# Patient Record
(demographics unavailable — no encounter records)

---

## 2024-10-09 NOTE — HISTORY OF PRESENT ILLNESS
[FreeTextEntry1] : 63 year old male here for hospital f/u after PE and thrombectomy.  He feels anxious at times after the procedure.  Overall he feels better. Taking Eliquis. No chest pain. Is seeing a hematologist. had a DVT in his leg as well.

## 2024-10-09 NOTE — PHYSICAL EXAM
[Well Developed] : well developed [Well Nourished] : well nourished [No Carotid Bruit] : no carotid bruit [Normal S1, S2] : normal S1, S2 [Clear Lung Fields] : clear lung fields [Soft] : abdomen soft [Normal Gait] : normal gait [No Edema] : no edema [No Rash] : no rash [Moves all extremities] : moves all extremities [Alert and Oriented] : alert and oriented

## 2024-10-09 NOTE — DISCUSSION/SUMMARY
[FreeTextEntry1] : 1) Pulmonary embolus: C/w Eliquis. Seeing hematology as well. Will repeat echocardiogram to assess RV in 6 months  2) HTN: Controlled. C/w Amlodipine 5mg QD, and losartan 100mg QD  3) PAD: Sees Vascular. On plavix  4) DM: On treatment. discussed diet.  [EKG obtained to assist in diagnosis and management of assessed problem(s)] : EKG obtained to assist in diagnosis and management of assessed problem(s)

## 2024-10-23 NOTE — BEGINNING OF VISIT
[Medical reason not done] : Medical reason not done [0] : 2) Feeling down, depressed, or hopeless: Not at all (0) [Pain Scale: ___] : On a scale of 1-10, today the patient's pain is a(n) [unfilled]. [Future Reassessment of Pain Scale] : Future reassessment of pain scale [Former] : Former [> 15 Years] : > 15 Years [Date Discussed (MM/DD/YY): ___] : Discussed: [unfilled] [Patient/Caregiver not ready to engage] : Patient/Caregiver not ready to engage [[id number]] : 0 [Medication(s)] : Medication(s)

## 2024-10-23 NOTE — ADDENDUM
[FreeTextEntry1] : Documented by Omero Butcher acting as scribe for Dr. Cabrera on  10/23/2024.    All Medical record entries made by the Scribe were at my, Dr. Cabrera's, direction and personally dictated by me on  10/23/2024. I have reviewed the chart and agree that the record accurately reflects my personal performance of the history, physical exam, assessment and plan. I have also personally directed, reviewed, and agreed with the discharge instructions.

## 2024-10-23 NOTE — HISTORY OF PRESENT ILLNESS
[de-identified] : Jermaine Arevalo is a 63 yr old male with PMH of HTN, DM2, PAD (LLE angioplasty 3 years ago, RLE angioplasty 7/12/24, previously on plavix) diagnosed with acute saddle PE/DVT (9/30/24).  He was brought to [facility] ED on 9/29/24 for evaluation of LOC for few seconds a/w loss of bladder control, left arm twitching, and profuse sweating. He was worked-up for stroke vs seizure, CT-Head was negative for acute intracranial hemorrhage. Chest CTA imaging revealed acute bilateral PE involving the distal main pulmonary arteries extending into all 5 lobar branches, component of right sided pulmonary embolus extending to the central pulmonary trunk, dilated RV, pulmonary hypertension. US duplex with right femoral DVT. EKG showed RBBB with S1Q3T3. TTE with LV EF 50%, Severely enlarged right ventricular cavity size and severely reduced right ventricular systolic function, Akinesis of the right ventricular free wall and apex, Mild tricuspid regurgitation, Estimated pulmonary artery systolic pressure of 53 mmHg. He was then transferred to [facility] w/ heparin GTT.   9/29/24-10/3/24 [facility] admission course s/p INARI thrombectomy on 9/30/24 Limited TTE after INARI thrombectomy showed normal LV systolic function, mildly enlarged RV cavity size and mildly reduced RV systolic function.  He was then discharged on Eliquis 10 mg po BID for a total of 7 days, followed by Eliquis 5 mg BID   Images/Procedures:  9/29/24 CT Head stroke protocol: No acute intracranial hemorrhage, mass effect, or midline shift.  9/29/4 CT A/P:IMPRESSION: -Bilateral lower lobe pulmonary emboli, partially imaged. Dilated right ventricle which may reflect right heart strain. Correlate with dedicated CT pulmonary angiogram. -Multivessel coronary artery calcifications. -Question gastritis.  9/29/24 CT Angio chest pulm artery wawic:IMPRESSION: Acute bilateral pulmonary emboli involving distal main pulmonary arteries extending into all 5 lobar branches. Component of the right-sided pulmonary embolus extends to the central pulmonary trunk on image 42 series 301. Main pulmonary artery is enlarged measuring 3.5 cm, which may reflect pulmonary hypertension. Elevated RV to LV ratio suggests right heart strain. Correlate with troponin value and hemodynamics to evaluate for significant pulmonary embolus.  9/29/24 US Duplex latia.LE:IMPRESSION: Deep venous thrombosis in the mid and distal right femoral vein. No evidence of deep venous thrombosis in the left lower extremity.  9/29/24 TTE:CONCLUSION: 1. technicaly difficult image quality 2.Left ventricular cavity is normal in size. Left ventricular wall thickness is mildly increased.  Left ventricular systolic function is borderline with an ejection fraction visually estimated at 50%. 3. Severely enlarged right ventricular cavity size and severely reduced right ventricular systolic function. 4. Akinesis of the right ventricular wall and apex. 5. Mild tricuspid regurgitation. 6. Fibrocalcific aortic valve sclerosis without stenosis. 7. Estimated pulmonary artery systolic pressure is 53 mm Hg  8. No pericardial effusion seen. 9. No prior echocardiogram is available for comparison.  9/30/24 s/p INARI Thrombectomy  10/1/24 TTE: CONCLUSIONS: 1. Left ventricular systolic function is normal. 2. Mildly enlarged right ventricular cavity size and mildly reduced right ventricular systolic function  Patient presents for initial visit Feels pain on back frequently, last few days has been running down buttocks and right leg Dr. Berger was vascular surgeon at [facility] Per patient for right leg angioplasty he reports needed to compress left side to stop bleeding Has prostate exam with PCP coming up in November 2024 Last colonoscopy within past 5 years Denies flight travels, long car rides, during Sept 2024 Did not have leg pain, SOB, chest pains around the time of diagnosis Has arterial doppler scheduled in November with Dr. Berger He reports sleeping fine recently, he reports he snores  PMHx: PMH of HTN, DM2, PAD FMHx: Denies family h/o blood clots Surg Hx: LLE angioplasty 3 years ago, RLE angioplasty 7/12/24 (previously on plavix), cholecystectomy, eye surgery Soc Hx: Former smoker (>15 years ago), drinks wine 2 glasses per day, 5 days a week, repairs vending machines at [employer] Care Team: Vascular Surgeon - Dr. Berger, [facility] PCP - Dr. Go

## 2024-10-23 NOTE — BEGINNING OF VISIT
[Medical reason not done] : Medical reason not done [0] : 2) Feeling down, depressed, or hopeless: Not at all (0) [Pain Scale: ___] : On a scale of 1-10, today the patient's pain is a(n) [unfilled]. [Future Reassessment of Pain Scale] : Future reassessment of pain scale [Former] : Former [> 15 Years] : > 15 Years [Date Discussed (MM/DD/YY): ___] : Discussed: [unfilled] [Patient/Caregiver not ready to engage] : Patient/Caregiver not ready to engage [[id number]] : 0 [Medication(s)] : Medication(s)

## 2024-10-23 NOTE — HISTORY OF PRESENT ILLNESS
[de-identified] : Jermaine Arevalo is a 63 yr old male with PMH of HTN, DM2, PAD (LLE angioplasty 3 years ago, RLE angioplasty 7/12/24, previously on plavix) diagnosed with acute saddle PE/DVT (9/30/24).  He was brought to [facility] ED on 9/29/24 for evaluation of LOC for few seconds a/w loss of bladder control, left arm twitching, and profuse sweating. He was worked-up for stroke vs seizure, CT-Head was negative for acute intracranial hemorrhage. Chest CTA imaging revealed acute bilateral PE involving the distal main pulmonary arteries extending into all 5 lobar branches, component of right sided pulmonary embolus extending to the central pulmonary trunk, dilated RV, pulmonary hypertension. US duplex with right femoral DVT. EKG showed RBBB with S1Q3T3. TTE with LV EF 50%, Severely enlarged right ventricular cavity size and severely reduced right ventricular systolic function, Akinesis of the right ventricular free wall and apex, Mild tricuspid regurgitation, Estimated pulmonary artery systolic pressure of 53 mmHg. He was then transferred to [facility] w/ heparin GTT.   9/29/24-10/3/24 [facility] admission course s/p INARI thrombectomy on 9/30/24 Limited TTE after INARI thrombectomy showed normal LV systolic function, mildly enlarged RV cavity size and mildly reduced RV systolic function.  He was then discharged on Eliquis 10 mg po BID for a total of 7 days, followed by Eliquis 5 mg BID   Images/Procedures:  9/29/24 CT Head stroke protocol: No acute intracranial hemorrhage, mass effect, or midline shift.  9/29/4 CT A/P:IMPRESSION: -Bilateral lower lobe pulmonary emboli, partially imaged. Dilated right ventricle which may reflect right heart strain. Correlate with dedicated CT pulmonary angiogram. -Multivessel coronary artery calcifications. -Question gastritis.  9/29/24 CT Angio chest pulm artery wawic:IMPRESSION: Acute bilateral pulmonary emboli involving distal main pulmonary arteries extending into all 5 lobar branches. Component of the right-sided pulmonary embolus extends to the central pulmonary trunk on image 42 series 301. Main pulmonary artery is enlarged measuring 3.5 cm, which may reflect pulmonary hypertension. Elevated RV to LV ratio suggests right heart strain. Correlate with troponin value and hemodynamics to evaluate for significant pulmonary embolus.  9/29/24 US Duplex latia.LE:IMPRESSION: Deep venous thrombosis in the mid and distal right femoral vein. No evidence of deep venous thrombosis in the left lower extremity.  9/29/24 TTE:CONCLUSION: 1. technicaly difficult image quality 2.Left ventricular cavity is normal in size. Left ventricular wall thickness is mildly increased.  Left ventricular systolic function is borderline with an ejection fraction visually estimated at 50%. 3. Severely enlarged right ventricular cavity size and severely reduced right ventricular systolic function. 4. Akinesis of the right ventricular wall and apex. 5. Mild tricuspid regurgitation. 6. Fibrocalcific aortic valve sclerosis without stenosis. 7. Estimated pulmonary artery systolic pressure is 53 mm Hg  8. No pericardial effusion seen. 9. No prior echocardiogram is available for comparison.  9/30/24 s/p INARI Thrombectomy  10/1/24 TTE: CONCLUSIONS: 1. Left ventricular systolic function is normal. 2. Mildly enlarged right ventricular cavity size and mildly reduced right ventricular systolic function  Patient presents for initial visit Feels pain on back frequently, last few days has been running down buttocks and right leg Dr. Berger was vascular surgeon at [facility] Per patient for right leg angioplasty he reports needed to compress left side to stop bleeding Has prostate exam with PCP coming up in November 2024 Last colonoscopy within past 5 years Denies flight travels, long car rides, during Sept 2024 Did not have leg pain, SOB, chest pains around the time of diagnosis Has arterial doppler scheduled in November with Dr. 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 Berger, [facility] PCP - Dr. Go

## 2024-10-23 NOTE — ASSESSMENT
[FreeTextEntry1] : 63 yr old male with h/o obesity, PAD s/p angioplasty, DM, HTN, with acute bilateral PE involving the distal main pulmonary arteries extending into all 5 lobar branches, component of right sided pulmonary embolus extending to the central pulmonary trunk, dilated RV, pulmonary hypertension. US duplex with right femoral DVT.  9/30/24 s/p INARI thrombectomy Currently on On Eliquis 5mg BID  No prior personal or family history of VTE. Former smoker. No recent travel preceding VTE.  Discussed with patient that recent RLE angioplasty on 7/12/24 unlikely to be related to the PE / DVT.  Given unprovoked DVT / PE, will likely require lifelong AC.  Plan: -Continue Eliquis 5 mg BID -Hypercoagulable workup sent -Right leg pain that has worsened over last few days - US doppler today to r/o DVT.  -RTO in 6 months

## 2025-04-23 NOTE — ASSESSMENT
[FreeTextEntry1] : 63 yr old male with h/o obesity, PAD s/p angioplasty, DM, HTN, with acute bilateral PE involving the distal main pulmonary arteries extending into all 5 lobar branches, component of right sided pulmonary embolus extending to the central pulmonary trunk, dilated RV, pulmonary hypertension. US duplex with right femoral DVT.  9/30/24 s/p INARI thrombectomy Currently on On Eliquis 5mg BID  No prior personal or family history of VTE. Former smoker. No recent travel preceding VTE.  Discussed with patient that recent RLE angioplasty on 7/12/24 unlikely to be related to the PE / DVT.  10/23/24 hypercoag work up - FVL, PT gene mutation - negative, Normal AT III, protein C & S.  Given unprovoked DVT / PE, will likely require lifelong AC.  4/10/25 H/H with PCP - 15 / 45  Plan: -Continue Eliquis 5 mg BID -follow up with vascular -RTO in 6 months

## 2025-04-23 NOTE — HISTORY OF PRESENT ILLNESS
[de-identified] : Jermaine Arevalo is a 63 yr old male with PMH of HTN, DM2, PAD (LLE angioplasty 3 years ago, RLE angioplasty 7/12/24, previously on plavix) diagnosed with acute saddle PE/DVT (9/30/24).  He was brought to [facility] ED on 9/29/24 for evaluation of LOC for few seconds a/w loss of bladder control, left arm twitching, and profuse sweating. He was worked-up for stroke vs seizure, CT-Head was negative for acute intracranial hemorrhage. Chest CTA imaging revealed acute bilateral PE involving the distal main pulmonary arteries extending into all 5 lobar branches, component of right sided pulmonary embolus extending to the central pulmonary trunk, dilated RV, pulmonary hypertension. US duplex with right femoral DVT. EKG showed RBBB with S1Q3T3. TTE with LV EF 50%, Severely enlarged right ventricular cavity size and severely reduced right ventricular systolic function, Akinesis of the right ventricular free wall and apex, Mild tricuspid regurgitation, Estimated pulmonary artery systolic pressure of 53 mmHg. He was then transferred to [facility] w/ heparin GTT.   9/29/24-10/3/24 [facility] admission course s/p INARI thrombectomy on 9/30/24 Limited TTE after INARI thrombectomy showed normal LV systolic function, mildly enlarged RV cavity size and mildly reduced RV systolic function.  He was then discharged on Eliquis 10 mg po BID for a total of 7 days, followed by Eliquis 5 mg BID   Images/Procedures:  9/29/24 CT Head stroke protocol: No acute intracranial hemorrhage, mass effect, or midline shift.  9/29/4 CT A/P:IMPRESSION: -Bilateral lower lobe pulmonary emboli, partially imaged. Dilated right ventricle which may reflect right heart strain. Correlate with dedicated CT pulmonary angiogram. -Multivessel coronary artery calcifications. -Question gastritis.  9/29/24 CT Angio chest pulm artery wawic:IMPRESSION: Acute bilateral pulmonary emboli involving distal main pulmonary arteries extending into all 5 lobar branches. Component of the right-sided pulmonary embolus extends to the central pulmonary trunk on image 42 series 301. Main pulmonary artery is enlarged measuring 3.5 cm, which may reflect pulmonary hypertension. Elevated RV to LV ratio suggests right heart strain. Correlate with troponin value and hemodynamics to evaluate for significant pulmonary embolus.  9/29/24 US Duplex latia.LE:IMPRESSION: Deep venous thrombosis in the mid and distal right femoral vein. No evidence of deep venous thrombosis in the left lower extremity.  9/29/24 TTE:CONCLUSION: 1. technicaly difficult image quality 2.Left ventricular cavity is normal in size. Left ventricular wall thickness is mildly increased.  Left ventricular systolic function is borderline with an ejection fraction visually estimated at 50%. 3. Severely enlarged right ventricular cavity size and severely reduced right ventricular systolic function. 4. Akinesis of the right ventricular wall and apex. 5. Mild tricuspid regurgitation. 6. Fibrocalcific aortic valve sclerosis without stenosis. 7. Estimated pulmonary artery systolic pressure is 53 mm Hg  8. No pericardial effusion seen. 9. No prior echocardiogram is available for comparison.  9/30/24 s/p INARI Thrombectomy  10/1/24 TTE: CONCLUSIONS: 1. Left ventricular systolic function is normal. 2. Mildly enlarged right ventricular cavity size and mildly reduced right ventricular systolic function  Patient presents for initial visit Feels pain on back frequently, last few days has been running down buttocks and right leg Dr. Berger was vascular surgeon at [facility] Per patient for right leg angioplasty he reports needed to compress left side to stop bleeding Has prostate exam with PCP coming up in November 2024 Last colonoscopy within past 5 years Denies flight travels, long car rides, during Sept 2024 Did not have leg pain, SOB, chest pains around the time of diagnosis Has arterial doppler scheduled in November with Dr. 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 Berger, [facility] PCP - Dr. Go  [de-identified] : REturns for follow up Notes his feet hurt, he's always is switching around shoes Notes knees hurt Continues on Eliquis  Denies any bleeding or bruising. No melena No weight loss.  Notes walking long distances causes calf pain -   Vascular - Dr. Rosy CASTAÑEDA